# Patient Record
Sex: FEMALE | Race: OTHER | Employment: PART TIME | URBAN - METROPOLITAN AREA
[De-identification: names, ages, dates, MRNs, and addresses within clinical notes are randomized per-mention and may not be internally consistent; named-entity substitution may affect disease eponyms.]

---

## 2018-07-18 ENCOUNTER — HOSPITAL ENCOUNTER (OUTPATIENT)
Dept: LAB | Age: 38
Discharge: HOME | End: 2018-07-18

## 2018-07-18 LAB
HIV 1+2 AB+HIV1 P24 AG SERPL QL IA: NONREACTIVE
HIV12 RESULT COMMENT, HHIVC: NORMAL

## 2018-07-18 PROCEDURE — 87389 HIV-1 AG W/HIV-1&-2 AB AG IA: CPT | Performed by: INTERNAL MEDICINE

## 2019-09-18 ENCOUNTER — HOSPITAL ENCOUNTER (OUTPATIENT)
Dept: LAB | Age: 39
Discharge: HOME | End: 2019-09-18

## 2019-09-18 LAB
ANION GAP SERPL CALC-SCNC: 7 MMOL/L (ref 5–15)
BUN SERPL-MCNC: 10 MG/DL (ref 6–20)
BUN/CREAT SERPL: 16 (ref 12–20)
CALCIUM SERPL-MCNC: 9 MG/DL (ref 8.5–10.1)
CHLORIDE SERPL-SCNC: 104 MMOL/L (ref 97–108)
CHOLEST SERPL-MCNC: 189 MG/DL
CO2 SERPL-SCNC: 26 MMOL/L (ref 21–32)
CREAT SERPL-MCNC: 0.61 MG/DL (ref 0.7–1.3)
EST. AVERAGE GLUCOSE BLD GHB EST-MCNC: 100 MG/DL
GLUCOSE SERPL-MCNC: 82 MG/DL (ref 65–100)
HBA1C MFR BLD: 5.1 % (ref 4.2–6.3)
HDLC SERPL-MCNC: 47 MG/DL
HDLC SERPL: 4 {RATIO} (ref 0–5)
LDLC SERPL CALC-MCNC: 110.6 MG/DL (ref 0–100)
LIPID PROFILE,FLP: ABNORMAL
POTASSIUM SERPL-SCNC: 3.9 MMOL/L (ref 3.5–5.1)
SODIUM SERPL-SCNC: 137 MMOL/L (ref 136–145)
TRIGL SERPL-MCNC: 157 MG/DL (ref ?–150)
VLDLC SERPL CALC-MCNC: 31.4 MG/DL

## 2019-09-18 PROCEDURE — 80048 BASIC METABOLIC PNL TOTAL CA: CPT

## 2019-09-18 PROCEDURE — 83036 HEMOGLOBIN GLYCOSYLATED A1C: CPT

## 2019-09-18 PROCEDURE — 80061 LIPID PANEL: CPT

## 2021-03-08 ENCOUNTER — HOSPITAL ENCOUNTER (OUTPATIENT)
Dept: LAB | Age: 41
Discharge: HOME | End: 2021-03-08

## 2021-03-08 PROCEDURE — 80061 LIPID PANEL: CPT

## 2021-03-08 PROCEDURE — 83036 HEMOGLOBIN GLYCOSYLATED A1C: CPT

## 2021-03-08 PROCEDURE — 80053 COMPREHEN METABOLIC PANEL: CPT

## 2021-03-08 PROCEDURE — 85025 COMPLETE CBC W/AUTO DIFF WBC: CPT

## 2021-03-09 LAB
ALBUMIN SERPL-MCNC: 3.8 G/DL (ref 3.5–5)
ALBUMIN/GLOB SERPL: 1.1 {RATIO} (ref 1.1–2.2)
ALP SERPL-CCNC: 97 U/L (ref 45–117)
ALT SERPL-CCNC: 24 U/L (ref 12–78)
ANION GAP SERPL CALC-SCNC: 6 MMOL/L (ref 5–15)
AST SERPL-CCNC: 14 U/L (ref 15–37)
BASOPHILS # BLD: 0.1 K/UL (ref 0–0.1)
BASOPHILS NFR BLD: 1 % (ref 0–1)
BILIRUB SERPL-MCNC: 0.3 MG/DL (ref 0.2–1)
BUN SERPL-MCNC: 12 MG/DL (ref 6–20)
BUN/CREAT SERPL: 21 (ref 12–20)
CALCIUM SERPL-MCNC: 8.3 MG/DL (ref 8.5–10.1)
CHLORIDE SERPL-SCNC: 106 MMOL/L (ref 97–108)
CHOLEST SERPL-MCNC: 194 MG/DL
CO2 SERPL-SCNC: 25 MMOL/L (ref 21–32)
CREAT SERPL-MCNC: 0.57 MG/DL (ref 0.7–1.3)
DIFFERENTIAL METHOD BLD: ABNORMAL
EOSINOPHIL # BLD: 0.1 K/UL (ref 0–0.4)
EOSINOPHIL NFR BLD: 1 % (ref 0–7)
ERYTHROCYTE [DISTWIDTH] IN BLOOD BY AUTOMATED COUNT: 15.2 % (ref 11.5–14.5)
EST. AVERAGE GLUCOSE BLD GHB EST-MCNC: 123 MG/DL
GLOBULIN SER CALC-MCNC: 3.4 G/DL (ref 2–4)
GLUCOSE SERPL-MCNC: 126 MG/DL (ref 65–100)
HBA1C MFR BLD: 5.9 % (ref 4–5.6)
HCT VFR BLD AUTO: 37.3 % (ref 36.6–50.3)
HDLC SERPL-MCNC: 60 MG/DL
HDLC SERPL: 3.2 {RATIO} (ref 0–5)
HGB BLD-MCNC: 11.7 G/DL (ref 12.1–17)
IMM GRANULOCYTES # BLD AUTO: 0 K/UL (ref 0–0.04)
IMM GRANULOCYTES NFR BLD AUTO: 0 % (ref 0–0.5)
LDLC SERPL CALC-MCNC: 114.8 MG/DL (ref 0–100)
LIPID PROFILE,FLP: ABNORMAL
LYMPHOCYTES # BLD: 2.4 K/UL (ref 0.8–3.5)
LYMPHOCYTES NFR BLD: 37 % (ref 12–49)
MCH RBC QN AUTO: 27.5 PG (ref 26–34)
MCHC RBC AUTO-ENTMCNC: 31.4 G/DL (ref 30–36.5)
MCV RBC AUTO: 87.6 FL (ref 80–99)
MONOCYTES # BLD: 0.4 K/UL (ref 0–1)
MONOCYTES NFR BLD: 6 % (ref 5–13)
NEUTS SEG # BLD: 3.4 K/UL (ref 1.8–8)
NEUTS SEG NFR BLD: 55 % (ref 32–75)
NRBC # BLD: 0 K/UL (ref 0–0.01)
NRBC BLD-RTO: 0 PER 100 WBC
PLATELET # BLD AUTO: 379 K/UL (ref 150–400)
PMV BLD AUTO: 10.5 FL (ref 8.9–12.9)
POTASSIUM SERPL-SCNC: 4.2 MMOL/L (ref 3.5–5.1)
PROT SERPL-MCNC: 7.2 G/DL (ref 6.4–8.2)
RBC # BLD AUTO: 4.26 M/UL (ref 4.1–5.7)
SODIUM SERPL-SCNC: 137 MMOL/L (ref 136–145)
TRIGL SERPL-MCNC: 96 MG/DL (ref ?–150)
VLDLC SERPL CALC-MCNC: 19.2 MG/DL
WBC # BLD AUTO: 6.3 K/UL (ref 4.1–11.1)

## 2022-03-22 ENCOUNTER — TELEPHONE (OUTPATIENT)
Dept: FAMILY PLANNING/WOMEN'S HEALTH CLINIC | Age: 42
End: 2022-03-22

## 2022-03-22 NOTE — TELEPHONE ENCOUNTER
Spoke with patient on phone to screen for [facility]. Overview of program given. Eligibility criteria reviewed.  Client does meet all qualifications and does want to enter into program. Will schedule breast exam and mammogram.

## 2022-07-14 ENCOUNTER — TELEPHONE (OUTPATIENT)
Dept: FAMILY PLANNING/WOMEN'S HEALTH CLINIC | Age: 42
End: 2022-07-14

## 2022-07-14 NOTE — TELEPHONE ENCOUNTER
Called patient to confirm upcoming appointment. Patient did not answer and voicemail was left with appointment information. A text message has also been sent.

## 2022-07-21 ENCOUNTER — HOSPITAL ENCOUNTER (OUTPATIENT)
Dept: MAMMOGRAPHY | Age: 42
Discharge: HOME | End: 2022-07-21

## 2022-07-21 ENCOUNTER — OFFICE VISIT (OUTPATIENT)
Dept: FAMILY PLANNING/WOMEN'S HEALTH CLINIC | Age: 42
End: 2022-07-21

## 2022-07-21 DIAGNOSIS — N60.12 FIBROCYSTIC BREAST CHANGES OF BOTH BREASTS: ICD-10-CM

## 2022-07-21 DIAGNOSIS — Z01.419 ENCOUNTER FOR WELL WOMAN EXAM: Primary | ICD-10-CM

## 2022-07-21 DIAGNOSIS — Z12.31 BREAST CANCER SCREENING BY MAMMOGRAM: ICD-10-CM

## 2022-07-21 DIAGNOSIS — N60.11 FIBROCYSTIC BREAST CHANGES OF BOTH BREASTS: ICD-10-CM

## 2022-07-21 PROCEDURE — 77063 BREAST TOMOSYNTHESIS BI: CPT

## 2022-07-21 NOTE — PROGRESS NOTES
Assessment/Plan:    Diagnoses and all orders for this visit:    1. Encounter for well woman exam    2. Fibrocystic breast changes of both breasts      3D mammogram       ANGELICA Segura expressed understanding of this plan. An AVS was printed and given to the patient.      ----------------------------------------------------------------------    Chief Complaint   Patient presents with    Well Woman     Screening mammogram         History of Present Illness:  34 yo presents for [facility] well woman exam   had BTL  Currently  from . No risk of DV. She is raising the son of her  sister- he is 25 and causing her a lot of heartache. She wanted to talk about this at length today  She has a PCP and she was advised to follow up with her PCP about the stress she is under  She will return for pelvic / pap  She has no breast complaints   Having periods       No past medical history on file. No Known Allergies    Social History     Tobacco Use    Smoking status: Never    Smokeless tobacco: Never       No family history on file. Physical Exam:     Visit Vitals  LMP 2022 (Approximate)       A&Ox3  WDWN NAD  Respirations normal and non labored  Breast exam- jann neg for mass, tenderness, skin color changes, dimpling or retractions.  Jann she has fibrocystic breast changes present

## 2022-07-21 NOTE — PROGRESS NOTES
EV[facility]OM[facility] LIFE HISTORY QUESTIONNAIRE       No Yes Comments   Has a doctor ever seen or felt anything wrong with your breast? [x]                                  []                                     Have you ever had a breast biopsy? [x]                                  []                                          When and where was last mammogram performed? 2020 [facility]- does not recall name     Have you ever been told that there was a problem on your mammogram?   No Yes Comments   [x]                                  []                                       Do you have breast implants? No Yes Comments   [x]                                  []                                       When was your last Pap test performed? [address] [place] [address]  returning for Pelvic/pap smear    Have you ever had an abnormal Pap test?   No Yes Comments   [x]                                  []                                       Have you had a hysterectomy? No Yes Comments (why)   [x]                                  []                                       Have you been through menopause? No Yes Date of LMP   [x]                                  []                                  6/24/2022     Did your mother take SWAPNIL? No Yes Unknown   [x]                                  []                                       Do you have a history of HIV exposure? No Yes    [x]                                  []                                       Have you ever been diagnosed with any type of Cancer   No Yes Comments (type,when,where,type of treatment   [x]                                  []                                          Has a family member been diagnosed with breast or ovarian cancer?    No Yes Comments (which family members, and type   [x]                                  []                                       Are you taking hormone replacement therapy (HRT)     No Yes Comments   [x] []                                       How many times have you been pregnant? 2        Number of live births ? 2    Are you experiencing any of the following? No Yes Comments   Nipple Discharge [x]                                  []                                     Breast Lump/Masses [x]                                  []                                     Breast Skin Changes [x]                                  []                                          No Yes Comments   Vaginal Discharge [x]                                  []                                     Abnormal/unusual vaginal bleeding [x]                                  []                                         Are you experiencing any other health problems?  none      Age at first period?  13  Age at first birth? 21      Wt-- 130 lbs

## 2022-08-31 ENCOUNTER — TRANSCRIBE ORDER (OUTPATIENT)
Dept: SCHEDULING | Age: 42
End: 2022-08-31

## 2022-09-06 ENCOUNTER — TELEPHONE (OUTPATIENT)
Dept: FAMILY PLANNING/WOMEN'S HEALTH CLINIC | Age: 42
End: 2022-09-06

## 2022-09-06 NOTE — TELEPHONE ENCOUNTER
Called patient to remind of upcoming appointment 2x. No answer from the patient, left voicemail on 2nd attempt. Provided the screening line([phone]) in case patient has to reschedule. A text message has been sent providing appointment details.

## 2022-09-13 ENCOUNTER — HOSPITAL ENCOUNTER (OUTPATIENT)
Dept: LAB | Age: 42
Discharge: HOME | End: 2022-09-13

## 2022-09-13 ENCOUNTER — OFFICE VISIT (OUTPATIENT)
Dept: FAMILY PLANNING/WOMEN'S HEALTH CLINIC | Age: 42
End: 2022-09-13

## 2022-09-13 DIAGNOSIS — Z01.419 ENCOUNTER FOR WELL WOMAN EXAM: Primary | ICD-10-CM

## 2022-09-13 DIAGNOSIS — N85.4: ICD-10-CM

## 2022-09-13 PROCEDURE — 87624 HPV HI-RISK TYP POOLED RSLT: CPT

## 2022-09-13 PROCEDURE — 88175 CYTOPATH C/V AUTO FLUID REDO: CPT

## 2022-09-13 NOTE — PROGRESS NOTES
Assessment/Plan:    Diagnoses and all orders for this visit:    1. Encounter for well woman exam    2. Tipped uterus  Pap only portion  Cervix is posterior and retroverted             Marko Goldmann McFerren, PA-C Emil Chandler expressed understanding of this plan. An AVS was printed and given to the patient.      ----------------------------------------------------------------------    Chief Complaint   Patient presents with    Well Woman     Pap only       History of Present Illness:  [facility] pap/pelvic portion of exam  Mammo last month was neg   Has regular periods. Sometimes has spotting after period. Not a change for her  In a safe relationship, no risk of Dv  No pelvic complaints   No hx of abnl paps. Last one 3 years ago    both via      No past medical history on file. No Known Allergies    Social History     Tobacco Use    Smoking status: Never    Smokeless tobacco: Never       No family history on file. Physical Exam:     Visit Vitals  LMP 2022       A&Ox3  WDWN NAD  Respirations normal and non labored  Pelvic- ext neg for lesion or discharge. She has menstrual blood in vagina.  Cervix is quite posterior and tipped, no lesions  Adnexal exam neg for mass or tenderness

## 2022-09-13 NOTE — PROGRESS NOTES
Patient's eligibility for the [address] [place] [address] [facility]ILD AND ADOLESCENT [facility] program was reassess today- per patient her income, household and insurance status has not change.

## 2022-09-13 NOTE — PROGRESS NOTES
Pt here today for pap only. LMP: 9/4/22. Pt denies vaginal pain, abnormal vaginal bleeding and no abnormal vaginal discharge. Kalia Espinosa RN

## 2023-03-27 ENCOUNTER — HOSPITAL ENCOUNTER (OUTPATIENT)
Dept: LAB | Age: 43
Discharge: HOME | End: 2023-03-27

## 2023-03-27 PROCEDURE — 80053 COMPREHEN METABOLIC PANEL: CPT

## 2023-03-27 PROCEDURE — 84443 ASSAY THYROID STIM HORMONE: CPT

## 2023-03-27 PROCEDURE — 83036 HEMOGLOBIN GLYCOSYLATED A1C: CPT

## 2023-03-27 PROCEDURE — 36415 COLL VENOUS BLD VENIPUNCTURE: CPT

## 2023-03-27 PROCEDURE — 80061 LIPID PANEL: CPT

## 2023-03-27 PROCEDURE — 85025 COMPLETE CBC W/AUTO DIFF WBC: CPT

## 2023-03-28 LAB
ALBUMIN SERPL-MCNC: 3.7 G/DL (ref 3.5–5)
ALBUMIN/GLOB SERPL: 1.1 (ref 1.1–2.2)
ALP SERPL-CCNC: 84 U/L (ref 45–117)
ALT SERPL-CCNC: 28 U/L (ref 12–78)
ANION GAP SERPL CALC-SCNC: 3 MMOL/L (ref 5–15)
AST SERPL-CCNC: 20 U/L (ref 15–37)
BASOPHILS # BLD: 0.1 K/UL (ref 0–0.1)
BASOPHILS NFR BLD: 1 % (ref 0–1)
BILIRUB SERPL-MCNC: 0.5 MG/DL (ref 0.2–1)
BUN SERPL-MCNC: 14 MG/DL (ref 6–20)
BUN/CREAT SERPL: 25 (ref 12–20)
CALCIUM SERPL-MCNC: 8.4 MG/DL (ref 8.5–10.1)
CHLORIDE SERPL-SCNC: 108 MMOL/L (ref 97–108)
CHOLEST SERPL-MCNC: 177 MG/DL
CO2 SERPL-SCNC: 27 MMOL/L (ref 21–32)
CREAT SERPL-MCNC: 0.56 MG/DL (ref 0.55–1.02)
DIFFERENTIAL METHOD BLD: ABNORMAL
EOSINOPHIL # BLD: 0.1 K/UL (ref 0–0.4)
EOSINOPHIL NFR BLD: 1 % (ref 0–7)
ERYTHROCYTE [DISTWIDTH] IN BLOOD BY AUTOMATED COUNT: 16.2 % (ref 11.5–14.5)
EST. AVERAGE GLUCOSE BLD GHB EST-MCNC: 123 MG/DL
GLOBULIN SER CALC-MCNC: 3.3 G/DL (ref 2–4)
GLUCOSE SERPL-MCNC: 107 MG/DL (ref 65–100)
HBA1C MFR BLD: 5.9 % (ref 4–5.6)
HCT VFR BLD AUTO: 37.1 % (ref 35–47)
HDLC SERPL-MCNC: 56 MG/DL
HDLC SERPL: 3.2 (ref 0–5)
HGB BLD-MCNC: 11.6 G/DL (ref 11.5–16)
IMM GRANULOCYTES # BLD AUTO: 0.1 K/UL (ref 0–0.04)
IMM GRANULOCYTES NFR BLD AUTO: 1 % (ref 0–0.5)
LDLC SERPL CALC-MCNC: 103.2 MG/DL (ref 0–100)
LYMPHOCYTES # BLD: 1.8 K/UL (ref 0.8–3.5)
LYMPHOCYTES NFR BLD: 36 % (ref 12–49)
MCH RBC QN AUTO: 26.5 PG (ref 26–34)
MCHC RBC AUTO-ENTMCNC: 31.3 G/DL (ref 30–36.5)
MCV RBC AUTO: 84.9 FL (ref 80–99)
MONOCYTES # BLD: 0.3 K/UL (ref 0–1)
MONOCYTES NFR BLD: 7 % (ref 5–13)
NEUTS SEG # BLD: 2.7 K/UL (ref 1.8–8)
NEUTS SEG NFR BLD: 54 % (ref 32–75)
NRBC # BLD: 0 K/UL (ref 0–0.01)
NRBC BLD-RTO: 0 PER 100 WBC
PLATELET # BLD AUTO: 377 K/UL (ref 150–400)
PMV BLD AUTO: 10.2 FL (ref 8.9–12.9)
POTASSIUM SERPL-SCNC: 4.2 MMOL/L (ref 3.5–5.1)
PROT SERPL-MCNC: 7 G/DL (ref 6.4–8.2)
RBC # BLD AUTO: 4.37 M/UL (ref 3.8–5.2)
SODIUM SERPL-SCNC: 138 MMOL/L (ref 136–145)
TRIGL SERPL-MCNC: 89 MG/DL (ref ?–150)
TSH SERPL DL<=0.05 MIU/L-ACNC: 0.88 UIU/ML (ref 0.36–3.74)
VLDLC SERPL CALC-MCNC: 17.8 MG/DL
WBC # BLD AUTO: 5 K/UL (ref 3.6–11)

## 2023-09-12 ENCOUNTER — HOSPITAL ENCOUNTER (OUTPATIENT)
Facility: HOSPITAL | Age: 43
Setting detail: SPECIMEN
Discharge: HOME | End: 2023-09-15

## 2023-09-12 PROCEDURE — 83036 HEMOGLOBIN GLYCOSYLATED A1C: CPT

## 2023-09-12 PROCEDURE — 36415 COLL VENOUS BLD VENIPUNCTURE: CPT

## 2023-09-13 LAB
EST. AVERAGE GLUCOSE BLD GHB EST-MCNC: 117 MG/DL
HBA1C MFR BLD: 5.7 % (ref 4–5.6)

## 2024-01-03 ENCOUNTER — TELEPHONE (OUTPATIENT)
Age: 44
End: 2024-01-03

## 2024-01-03 NOTE — TELEPHONE ENCOUNTER
Called patient to remind of upcoming appointment with [facility] on 1/18/2024- no answer. Left message with appt. Details and [facility] hotline in case that she wants to cancel/reschedule. Gina Graham

## 2024-01-05 ENCOUNTER — TRANSCRIBE ORDERS (OUTPATIENT)
Facility: HOSPITAL | Age: 44
End: 2024-01-05

## 2024-01-05 DIAGNOSIS — Z12.31 BREAST CANCER SCREENING BY MAMMOGRAM: Primary | ICD-10-CM

## 2024-01-18 ENCOUNTER — HOSPITAL ENCOUNTER (OUTPATIENT)
Facility: HOSPITAL | Age: 44
Discharge: HOME | End: 2024-01-21

## 2024-01-18 ENCOUNTER — OFFICE VISIT (OUTPATIENT)
Age: 44
End: 2024-01-18

## 2024-01-18 VITALS — HEIGHT: 60 IN | BODY MASS INDEX: 25.52 KG/M2 | WEIGHT: 130 LBS

## 2024-01-18 DIAGNOSIS — Z12.31 BREAST CANCER SCREENING BY MAMMOGRAM: ICD-10-CM

## 2024-01-18 DIAGNOSIS — Z01.419 ENCOUNTER FOR WELL WOMAN EXAM: Primary | ICD-10-CM

## 2024-01-18 PROCEDURE — 77063 BREAST TOMOSYNTHESIS BI: CPT

## 2024-01-18 NOTE — PROGRESS NOTES
EVERY WOMANS LIFE HISTORY QUESTIONNAIRE       No Yes Comments   Has a doctor ever seen or felt anything wrong with your breast? [x]                                  []                                     Have you ever had a breast biopsy? [x]                                  []                                          When and where was last mammogram performed?  7/21/2022- [facility]- [facility]    Have you ever been told that there was a problem on your mammogram?   No Yes Comments   [x]                                  []                                       Do you have breast implants?   No Yes Comments   [x]                                  []                                       When was your last Pap test performed? 9/12/2022- up to date on cervical screening. Pelvic declined.     Have you ever had an abnormal Pap test?   No Yes Comments   [x]                                  []                                       Have you had a hysterectomy?   No Yes Comments (why)   [x]                                  []                                       Have you been through menopause?   No Yes Date of LMP   [x]                                  []                                  12/18/2023 hx of BTL     Did your mother take JESSICA?  No Yes Unknown   [x]                                  []                                       Do you have a history of HIV exposure?  No Yes    [x]                                  []                                       Have you ever been diagnosed with any type of Cancer   No Yes Comments (type,when,where,type of treatment   [x]                                  []                                          Has a family member been diagnosed with breast or ovarian cancer?   No Yes Comments (which family members, and type   [x]                                  []                                       Are you taking hormone replacement therapy (HRT)     No Yes Comments   [x]

## 2024-01-19 ENCOUNTER — TELEPHONE (OUTPATIENT)
Age: 44
End: 2024-01-19

## 2024-01-19 DIAGNOSIS — R92.8 ABNORMAL MAMMOGRAM OF LEFT BREAST: Primary | ICD-10-CM

## 2024-01-19 NOTE — PROGRESS NOTES
Assessment/Plan:    Rina was seen today for well woman visit.    Diagnoses and all orders for this visit:    Encounter for well woman exam        No follow-up provider specified.    JACK Judgeneha Fieldno expressed understanding of this plan. An AVS was printed and given to the patient.      ----------------------------------------------------------------------    Chief Complaint   Patient presents with    Well Woman Visit       History of Present Illness:    Pt presents for [facility] annual well woman visit  She presents with no breast complaints or concerns  She denies risk for DV  UTD on pap  No hx of abnl mammogram   having periods     No past medical history on file.    No current outpatient medications on file.     No current facility-administered medications for this visit.       No Known Allergies    Social History     Tobacco Use    Smoking status: Never    Smokeless tobacco: Never       No family history on file.    Physical Exam:     LMP 2023 (Exact Date)     A&Ox3  WDWN NAD  Respirations normal and non labored  Breast exam- kraig neg for mass, tenderness, skin color changes, dimpling or retractions

## 2024-01-19 NOTE — TELEPHONE ENCOUNTER
The [facility]  was used. Pt informed that additional images are needed after her mammogram. Explained to pt why additional images needed, Pt verbalized understanding and denies questions.Pt understands that she will receive a phone call to schedule her additional images appt. Pt would like to go to [facility] location for additional images, will include this in email to . Email sent to get pt scheduled for additional images. HUI CASTRO RN

## 2024-02-16 ENCOUNTER — HOSPITAL ENCOUNTER (OUTPATIENT)
Facility: HOSPITAL | Age: 44
Discharge: HOME | End: 2024-02-19

## 2024-02-16 VITALS — HEIGHT: 60 IN | WEIGHT: 130 LBS | BODY MASS INDEX: 25.52 KG/M2

## 2024-02-16 DIAGNOSIS — R92.8 ABNORMAL MAMMOGRAM OF LEFT BREAST: ICD-10-CM

## 2024-02-16 PROCEDURE — G0279 TOMOSYNTHESIS, MAMMO: HCPCS

## 2024-02-19 ENCOUNTER — TELEPHONE (OUTPATIENT)
Age: 44
End: 2024-02-19

## 2024-02-19 NOTE — TELEPHONE ENCOUNTER
[facility] notes from 2024 and screening mammogram imaging report sent to patient's PCP NP Davidson Nowak as per policy and protocol. Screening mammogram order already scanned under media tab. Alyssa Banerjee RN

## 2024-02-28 ENCOUNTER — HOSPITAL ENCOUNTER (OUTPATIENT)
Facility: HOSPITAL | Age: 44
Setting detail: SPECIMEN
Discharge: HOME | End: 2024-03-02

## 2024-02-28 LAB
ALBUMIN SERPL-MCNC: 3.8 G/DL (ref 3.5–5)
ALBUMIN/GLOB SERPL: 1.1 (ref 1.1–2.2)
ALP SERPL-CCNC: 96 U/L (ref 45–117)
ALT SERPL-CCNC: 23 U/L (ref 12–78)
ANION GAP SERPL CALC-SCNC: 5 MMOL/L (ref 5–15)
AST SERPL-CCNC: 16 U/L (ref 15–37)
BILIRUB SERPL-MCNC: 0.5 MG/DL (ref 0.2–1)
BUN SERPL-MCNC: 10 MG/DL (ref 6–20)
BUN/CREAT SERPL: 17 (ref 12–20)
CALCIUM SERPL-MCNC: 8.6 MG/DL (ref 8.5–10.1)
CHLORIDE SERPL-SCNC: 110 MMOL/L (ref 97–108)
CHOLEST SERPL-MCNC: 196 MG/DL
CO2 SERPL-SCNC: 26 MMOL/L (ref 21–32)
CREAT SERPL-MCNC: 0.6 MG/DL (ref 0.55–1.02)
EST. AVERAGE GLUCOSE BLD GHB EST-MCNC: 117 MG/DL
GLOBULIN SER CALC-MCNC: 3.4 G/DL (ref 2–4)
GLUCOSE SERPL-MCNC: 101 MG/DL (ref 65–100)
HBA1C MFR BLD: 5.7 % (ref 4–5.6)
HDLC SERPL-MCNC: 55 MG/DL
HDLC SERPL: 3.6 (ref 0–5)
LDLC SERPL CALC-MCNC: 125 MG/DL (ref 0–100)
POTASSIUM SERPL-SCNC: 4.1 MMOL/L (ref 3.5–5.1)
PROT SERPL-MCNC: 7.2 G/DL (ref 6.4–8.2)
SODIUM SERPL-SCNC: 141 MMOL/L (ref 136–145)
TRIGL SERPL-MCNC: 80 MG/DL
VLDLC SERPL CALC-MCNC: 16 MG/DL

## 2024-02-28 PROCEDURE — 83036 HEMOGLOBIN GLYCOSYLATED A1C: CPT

## 2024-02-28 PROCEDURE — 36415 COLL VENOUS BLD VENIPUNCTURE: CPT

## 2024-02-28 PROCEDURE — 80061 LIPID PANEL: CPT

## 2024-02-28 PROCEDURE — 80053 COMPREHEN METABOLIC PANEL: CPT

## 2024-08-20 ENCOUNTER — HOSPITAL ENCOUNTER (OUTPATIENT)
Facility: HOSPITAL | Age: 44
Setting detail: SPECIMEN
Discharge: HOME | End: 2024-08-23

## 2024-08-20 LAB
BASOPHILS # BLD: 0.1 K/UL (ref 0–0.1)
BASOPHILS NFR BLD: 1 % (ref 0–1)
DIFFERENTIAL METHOD BLD: ABNORMAL
EOSINOPHIL # BLD: 0.1 K/UL (ref 0–0.4)
EOSINOPHIL NFR BLD: 1 % (ref 0–7)
ERYTHROCYTE [DISTWIDTH] IN BLOOD BY AUTOMATED COUNT: 18.1 % (ref 11.5–14.5)
HCT VFR BLD AUTO: 35 % (ref 35–47)
HGB BLD-MCNC: 10.6 G/DL (ref 11.5–16)
IMM GRANULOCYTES # BLD AUTO: 0 K/UL (ref 0–0.04)
IMM GRANULOCYTES NFR BLD AUTO: 0 % (ref 0–0.5)
LYMPHOCYTES # BLD: 1.9 K/UL (ref 0.8–3.5)
LYMPHOCYTES NFR BLD: 36 % (ref 12–49)
MCH RBC QN AUTO: 23.8 PG (ref 26–34)
MCHC RBC AUTO-ENTMCNC: 30.3 G/DL (ref 30–36.5)
MCV RBC AUTO: 78.7 FL (ref 80–99)
MONOCYTES # BLD: 0.4 K/UL (ref 0–1)
MONOCYTES NFR BLD: 6 % (ref 5–13)
NEUTS SEG # BLD: 3 K/UL (ref 1.8–8)
NEUTS SEG NFR BLD: 56 % (ref 32–75)
NRBC # BLD: 0 K/UL (ref 0–0.01)
NRBC BLD-RTO: 0 PER 100 WBC
PLATELET # BLD AUTO: 413 K/UL (ref 150–400)
PMV BLD AUTO: 9.9 FL (ref 8.9–12.9)
RBC # BLD AUTO: 4.45 M/UL (ref 3.8–5.2)
WBC # BLD AUTO: 5.4 K/UL (ref 3.6–11)

## 2024-08-20 PROCEDURE — 85025 COMPLETE CBC W/AUTO DIFF WBC: CPT

## 2024-08-20 PROCEDURE — 80061 LIPID PANEL: CPT

## 2024-08-20 PROCEDURE — 80053 COMPREHEN METABOLIC PANEL: CPT

## 2024-08-20 PROCEDURE — 83036 HEMOGLOBIN GLYCOSYLATED A1C: CPT

## 2024-08-21 LAB
ALBUMIN SERPL-MCNC: 3.9 G/DL (ref 3.5–5)
ALBUMIN/GLOB SERPL: 1.1 (ref 1.1–2.2)
ALP SERPL-CCNC: 103 U/L (ref 45–117)
ALT SERPL-CCNC: 24 U/L (ref 12–78)
ANION GAP SERPL CALC-SCNC: 4 MMOL/L (ref 5–15)
AST SERPL-CCNC: 15 U/L (ref 15–37)
BILIRUB SERPL-MCNC: 0.3 MG/DL (ref 0.2–1)
BUN SERPL-MCNC: 14 MG/DL (ref 6–20)
BUN/CREAT SERPL: 21 (ref 12–20)
CALCIUM SERPL-MCNC: 8.6 MG/DL (ref 8.5–10.1)
CHLORIDE SERPL-SCNC: 109 MMOL/L (ref 97–108)
CHOLEST SERPL-MCNC: 196 MG/DL
CO2 SERPL-SCNC: 26 MMOL/L (ref 21–32)
CREAT SERPL-MCNC: 0.67 MG/DL (ref 0.55–1.02)
EST. AVERAGE GLUCOSE BLD GHB EST-MCNC: 126 MG/DL
GLOBULIN SER CALC-MCNC: 3.5 G/DL (ref 2–4)
GLUCOSE SERPL-MCNC: 110 MG/DL (ref 65–100)
HBA1C MFR BLD: 6 % (ref 4–5.6)
HDLC SERPL-MCNC: 58 MG/DL
HDLC SERPL: 3.4 (ref 0–5)
LDLC SERPL CALC-MCNC: 123.4 MG/DL (ref 0–100)
POTASSIUM SERPL-SCNC: 4.3 MMOL/L (ref 3.5–5.1)
PROT SERPL-MCNC: 7.4 G/DL (ref 6.4–8.2)
SODIUM SERPL-SCNC: 139 MMOL/L (ref 136–145)
TRIGL SERPL-MCNC: 73 MG/DL
VLDLC SERPL CALC-MCNC: 14.6 MG/DL

## 2025-01-29 ENCOUNTER — HOSPITAL ENCOUNTER (OUTPATIENT)
Facility: HOSPITAL | Age: 45
Setting detail: SPECIMEN
Discharge: HOME | End: 2025-02-01

## 2025-01-29 LAB
BASOPHILS # BLD: 0.06 K/UL (ref 0–0.1)
BASOPHILS NFR BLD: 0.9 % (ref 0–1)
DIFFERENTIAL METHOD BLD: ABNORMAL
EOSINOPHIL # BLD: 0.05 K/UL (ref 0–0.4)
EOSINOPHIL NFR BLD: 0.8 % (ref 0–7)
ERYTHROCYTE [DISTWIDTH] IN BLOOD BY AUTOMATED COUNT: 16 % (ref 11.5–14.5)
FERRITIN SERPL-MCNC: 4 NG/ML (ref 26–388)
HCT VFR BLD AUTO: 37.9 % (ref 35–47)
HGB BLD-MCNC: 11.5 G/DL (ref 11.5–16)
IMM GRANULOCYTES # BLD AUTO: 0.01 K/UL (ref 0–0.04)
IMM GRANULOCYTES NFR BLD AUTO: 0.2 % (ref 0–0.5)
LYMPHOCYTES # BLD: 2.35 K/UL (ref 0.8–3.5)
LYMPHOCYTES NFR BLD: 36 % (ref 12–49)
MCH RBC QN AUTO: 24.8 PG (ref 26–34)
MCHC RBC AUTO-ENTMCNC: 30.3 G/DL (ref 30–36.5)
MCV RBC AUTO: 81.7 FL (ref 80–99)
MONOCYTES # BLD: 0.41 K/UL (ref 0–1)
MONOCYTES NFR BLD: 6.3 % (ref 5–13)
NEUTS SEG # BLD: 3.64 K/UL (ref 1.8–8)
NEUTS SEG NFR BLD: 55.8 % (ref 32–75)
NRBC # BLD: 0 K/UL (ref 0–0.01)
NRBC BLD-RTO: 0 PER 100 WBC
PLATELET # BLD AUTO: 397 K/UL (ref 150–400)
PMV BLD AUTO: 9.6 FL (ref 8.9–12.9)
RBC # BLD AUTO: 4.64 M/UL (ref 3.8–5.2)
WBC # BLD AUTO: 6.5 K/UL (ref 3.6–11)

## 2025-01-29 PROCEDURE — 83036 HEMOGLOBIN GLYCOSYLATED A1C: CPT

## 2025-01-29 PROCEDURE — 85025 COMPLETE CBC W/AUTO DIFF WBC: CPT

## 2025-01-29 PROCEDURE — 82728 ASSAY OF FERRITIN: CPT

## 2025-01-30 LAB
EST. AVERAGE GLUCOSE BLD GHB EST-MCNC: 120 MG/DL
HBA1C MFR BLD: 5.8 % (ref 4–5.6)

## 2025-04-04 ENCOUNTER — TRANSCRIBE ORDERS (OUTPATIENT)
Facility: HOSPITAL | Age: 45
End: 2025-04-04

## 2025-04-04 DIAGNOSIS — Z12.31 OTHER SCREENING MAMMOGRAM: Primary | ICD-10-CM

## 2025-04-24 ENCOUNTER — TELEPHONE (OUTPATIENT)
Age: 45
End: 2025-04-24

## 2025-04-24 NOTE — TELEPHONE ENCOUNTER
[facility] Screening line received a call from patient for a screening mammogram.    Patient was scheduled for May 1st, but wished to r/s. Patient wishes to go to [facility].

## 2025-08-13 ENCOUNTER — HOSPITAL ENCOUNTER (OUTPATIENT)
Facility: HOSPITAL | Age: 45
Setting detail: SPECIMEN
Discharge: HOME | End: 2025-08-16

## 2025-08-13 PROCEDURE — 36415 COLL VENOUS BLD VENIPUNCTURE: CPT

## 2025-08-13 PROCEDURE — 80061 LIPID PANEL: CPT

## 2025-08-13 PROCEDURE — 82728 ASSAY OF FERRITIN: CPT

## 2025-08-13 PROCEDURE — 83036 HEMOGLOBIN GLYCOSYLATED A1C: CPT

## 2025-08-13 PROCEDURE — 85025 COMPLETE CBC W/AUTO DIFF WBC: CPT

## 2025-08-13 PROCEDURE — 80053 COMPREHEN METABOLIC PANEL: CPT

## 2025-08-14 LAB
ALBUMIN SERPL-MCNC: 3.9 G/DL (ref 3.5–5.2)
ALBUMIN/GLOB SERPL: 1.3 (ref 1.1–2.2)
ALP SERPL-CCNC: 84 U/L (ref 35–104)
ALT SERPL-CCNC: 18 U/L (ref 10–35)
ANION GAP SERPL CALC-SCNC: 9 MMOL/L (ref 2–14)
AST SERPL-CCNC: 21 U/L (ref 10–35)
BASOPHILS # BLD: 0.07 K/UL (ref 0–0.1)
BASOPHILS NFR BLD: 1.3 % (ref 0–1)
BILIRUB SERPL-MCNC: 0.4 MG/DL (ref 0–1.2)
BUN SERPL-MCNC: 13 MG/DL (ref 6–20)
BUN/CREAT SERPL: 24 (ref 12–20)
CALCIUM SERPL-MCNC: 8.8 MG/DL (ref 8.6–10)
CHLORIDE SERPL-SCNC: 105 MMOL/L (ref 98–107)
CHOLEST SERPL-MCNC: 185 MG/DL (ref 0–200)
CO2 SERPL-SCNC: 23 MMOL/L (ref 20–29)
CREAT SERPL-MCNC: 0.55 MG/DL (ref 0.6–1)
DIFFERENTIAL METHOD BLD: ABNORMAL
EOSINOPHIL # BLD: 0.06 K/UL (ref 0–0.4)
EOSINOPHIL NFR BLD: 1.1 % (ref 0–7)
ERYTHROCYTE [DISTWIDTH] IN BLOOD BY AUTOMATED COUNT: 18.4 % (ref 11.5–14.5)
EST. AVERAGE GLUCOSE BLD GHB EST-MCNC: 121 MG/DL
FERRITIN SERPL-MCNC: 5 NG/ML (ref 13–252)
GLOBULIN SER CALC-MCNC: 3.1 G/DL (ref 2–4)
GLUCOSE SERPL-MCNC: 105 MG/DL (ref 65–100)
HBA1C MFR BLD: 5.9 % (ref 4–5.6)
HCT VFR BLD AUTO: 30.4 % (ref 35–47)
HDLC SERPL-MCNC: 52 MG/DL (ref 40–60)
HDLC SERPL: 3.6 (ref 0–5)
HGB BLD-MCNC: 9.2 G/DL (ref 11.5–16)
IMM GRANULOCYTES # BLD AUTO: 0.01 K/UL (ref 0–0.04)
IMM GRANULOCYTES NFR BLD AUTO: 0.2 % (ref 0–0.5)
LDLC SERPL CALC-MCNC: 116 MG/DL (ref 0–100)
LYMPHOCYTES # BLD: 2.21 K/UL (ref 0.8–3.5)
LYMPHOCYTES NFR BLD: 39.7 % (ref 12–49)
MCH RBC QN AUTO: 21.6 PG (ref 26–34)
MCHC RBC AUTO-ENTMCNC: 30.3 G/DL (ref 30–36.5)
MCV RBC AUTO: 71.4 FL (ref 80–99)
MONOCYTES # BLD: 0.42 K/UL (ref 0–1)
MONOCYTES NFR BLD: 7.5 % (ref 5–13)
NEUTS SEG # BLD: 2.8 K/UL (ref 1.8–8)
NEUTS SEG NFR BLD: 50.2 % (ref 32–75)
NRBC # BLD: 0 K/UL (ref 0–0.01)
NRBC BLD-RTO: 0 PER 100 WBC
PLATELET # BLD AUTO: 483 K/UL (ref 150–400)
PMV BLD AUTO: 9.8 FL (ref 8.9–12.9)
POTASSIUM SERPL-SCNC: 4.1 MMOL/L (ref 3.5–5.1)
PROT SERPL-MCNC: 7 G/DL (ref 6.4–8.3)
RBC # BLD AUTO: 4.26 M/UL (ref 3.8–5.2)
SODIUM SERPL-SCNC: 137 MMOL/L (ref 136–145)
TRIGL SERPL-MCNC: 83 MG/DL (ref 0–150)
VLDLC SERPL CALC-MCNC: 17 MG/DL
WBC # BLD AUTO: 5.6 K/UL (ref 3.6–11)

## 2025-08-18 ENCOUNTER — TRANSCRIBE ORDERS (OUTPATIENT)
Facility: HOSPITAL | Age: 45
End: 2025-08-18

## 2025-08-18 DIAGNOSIS — N92.1 EXCESSIVE MENSTRUATION WITH IRREGULAR CYCLE: Primary | ICD-10-CM

## 2025-08-22 ENCOUNTER — TRANSCRIBE ORDERS (OUTPATIENT)
Facility: HOSPITAL | Age: 45
End: 2025-08-22

## 2025-08-22 DIAGNOSIS — N92.1 EXCESSIVE AND FREQUENT MENSTRUATION WITH IRREGULAR CYCLE: Primary | ICD-10-CM

## 2025-09-03 ENCOUNTER — HOSPITAL ENCOUNTER (OUTPATIENT)
Facility: HOSPITAL | Age: 45
Discharge: HOME | End: 2025-09-06

## 2025-09-03 DIAGNOSIS — N92.1 EXCESSIVE AND FREQUENT MENSTRUATION WITH IRREGULAR CYCLE: ICD-10-CM

## 2025-09-03 PROCEDURE — 76830 TRANSVAGINAL US NON-OB: CPT

## 2025-09-03 PROCEDURE — 76856 US EXAM PELVIC COMPLETE: CPT
